# Patient Record
(demographics unavailable — no encounter records)

---

## 2024-12-02 NOTE — IMMUNIZATIONS
[Immunizations are up to date] : Immunizations are up to date [Records maintained by [facility]] : Records maintained by [facility]

## 2024-12-02 NOTE — HISTORY OF PRESENT ILLNESS
[Limited ADLs] : able to do activities of daily living with limitations [FreeTextEntry1] : Chandler is a 6-year-old male who presents for evaluation of hand pain.   In 2024, Chandler had started to endorse pain in his hands - occurs randomly and does not last for a long period of time. No morning stiffness, swelling or limitation. Initially, she felt it was due to overuse - he had been doing [employer] (kid's fitness activity/gym) over the summer, but he did not have any pain during summer. Mother state she would massage his hands and he would feel better. He does put his hands in his mouth a lot. He stopped having pain in his hands after the start of the school year, but mother was advised by Chandler's OT to have evaluation done due to his brother's history of a positive RNP with hair loss (he follows with Dr. Allen). He was seen by pediatric orthopedics (Dr. Natali James) - X-rays of hands were normal.   Of note, mother states that Chandler has some developmental delays/learning difficulties - he has not had any formal diagnosis of autism or a genetic disorder. He receives PT, OT, and speech through school. He has difficulties with handwriting, which mother feels have been getting worse (although she states his OT has not said this). He had been evaluated by a neurologist previously (Dr. 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   (+) abdominal pain with looser stools this week; No fever, headache, visual changes, mouth sores, cough, congestion, chest pain, difficulty breathing, nausea, vomiting, diarrhea, constipation, blood in the stool, dysuria, hematuria, joint pain, joint swelling, morning stiffness, back pain, or rash.   Birth History: Born 39 weeks via , difficulties with circumcision and 'swelling' at birth, US of kidneys were normal   Past Medical History: Developmental delays - walked at 15 months, sometimes talks full sentences (not consistent)  Past Surgical History: Circumcision, skin tag removed from ear  Family History: Older brother (17yo) - +RNP and hair loss, follows with Dr. Allen  Social History: 1st grade. Lives with parents and brother. Other brother is in college.  Medications: None Allergies: NKDA, dust mite, environmental allergies

## 2024-12-02 NOTE — PHYSICAL EXAM
[PERRLA] : CHAVA [S1, S2 Present] : S1, S2 present [Clear to auscultation] : clear to auscultation [Soft] : soft [NonTender] : non tender [Non Distended] : non distended [Normal Bowel Sounds] : normal bowel sounds [No Hepatosplenomegaly] : no hepatosplenomegaly [No Abnormal Lymph Nodes Palpated] : no abnormal lymph nodes palpated [Range Of Motion] : full range of motion [Intact Judgement] : intact judgement  [Insight Insight] : intact insight [Acute distress] : no acute distress [Erythematous Conjunctiva] : nonerythematous conjunctiva [Erythematous Oropharynx] : nonerythematous oropharynx [Lesions] : no lesions [Murmurs] : no murmurs [Joint effusions] : no joint effusions [Cranial nerves grossly intact] : cranial nerves grossly intact [Not Examined] : not examined [FreeTextEntry1] : well-appearing, nervous with exam, fingers inside mouth during visit  [de-identified] : no objective arthritis on exam, full ROM of all joints, no effusions or pain appreciated

## 2024-12-02 NOTE — CONSULT LETTER
[Dear  ___] : Dear  [unfilled], [Courtesy Letter:] : I had the pleasure of seeing your patient, [unfilled], in my office today. [Please see my note below.] : Please see my note below. [Consult Closing:] : Thank you very much for allowing me to participate in the care of this patient.  If you have any questions, please do not hesitate to contact me. [Sincerely,] : Sincerely, [FreeTextEntry2] : Naye Beasley MD [address], [place] [address] [FreeTextEntry3] : Sonali Yates MD Attending Physician, Pediatric Rheumatology [facility] | [facility]

## 2024-12-02 NOTE — PHYSICAL EXAM
[PERRLA] : CHAVA [S1, S2 Present] : S1, S2 present [Clear to auscultation] : clear to auscultation [Soft] : soft [NonTender] : non tender [Non Distended] : non distended [Normal Bowel Sounds] : normal bowel sounds [No Hepatosplenomegaly] : no hepatosplenomegaly [No Abnormal Lymph Nodes Palpated] : no abnormal lymph nodes palpated [Range Of Motion] : full range of motion [Intact Judgement] : intact judgement  [Insight Insight] : intact insight [Acute distress] : no acute distress [Erythematous Conjunctiva] : nonerythematous conjunctiva [Erythematous Oropharynx] : nonerythematous oropharynx [Lesions] : no lesions [Murmurs] : no murmurs [Joint effusions] : no joint effusions [Cranial nerves grossly intact] : cranial nerves grossly intact [Not Examined] : not examined [FreeTextEntry1] : well-appearing, nervous with exam, fingers inside mouth during visit  [de-identified] : no objective arthritis on exam, full ROM of all joints, no effusions or pain appreciated

## 2024-12-02 NOTE — CONSULT LETTER
[Dear  ___] : Dear  [unfilled], [Courtesy Letter:] : I had the pleasure of seeing your patient, [unfilled], in my office today. [Please see my note below.] : Please see my note below. [Consult Closing:] : Thank you very much for allowing me to participate in the care of this patient.  If you have any questions, please do not hesitate to contact me. [Sincerely,] : Sincerely, [FreeTextEntry2] : Naye Beasley MD [address], [place] [address] [FreeTextEntry3] : Sonali Yates MD Attending Physician, Pediatric Rheumatology [facility] | [facility]